# Patient Record
Sex: MALE | Race: WHITE
[De-identification: names, ages, dates, MRNs, and addresses within clinical notes are randomized per-mention and may not be internally consistent; named-entity substitution may affect disease eponyms.]

---

## 2018-02-09 ENCOUNTER — HOSPITAL ENCOUNTER (EMERGENCY)
Dept: HOSPITAL 76 - ED | Age: 48
Discharge: HOME | End: 2018-02-09
Payer: SELF-PAY

## 2018-02-09 VITALS — SYSTOLIC BLOOD PRESSURE: 141 MMHG | DIASTOLIC BLOOD PRESSURE: 97 MMHG
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
## 2018-02-09 DIAGNOSIS — S90.811A: ICD-10-CM

## 2018-02-09 DIAGNOSIS — Z02.89: Primary | ICD-10-CM

## 2018-02-09 PROCEDURE — 99282 EMERGENCY DEPT VISIT SF MDM: CPT

## 2018-02-09 PROCEDURE — 99283 EMERGENCY DEPT VISIT LOW MDM: CPT

## 2018-02-09 NOTE — ED PHYSICIAN DOCUMENTATION
History of Present Illness





- Stated complaint


Stated Complaint: FIT FOR CONFINEMENT





- History obtained from


History obtained from: Patient, Police





- History of Present Illness


Timing: Today





- Additonal information


Additional information: 





Patient is a 48 year old male brought in by police for fit for confinement.  

patient went out for a walk and allegedly was hiding from police and walked 

through some black berry bushes.  They wanted to make sure he didn't have any 

thorns on hi so they brought him n for evaluation.  Patient states he is up to 

date on his tetanus and was able to ambulate without any difficulty.   





Review of Systems


Constitutional: reports: Reviewed and negative


Eyes: reports: Reviewed and negative


Ears: reports: Reviewed and negative


Nose: reports: Reviewed and negative


Throat: reports: Reviewed and negative


Cardiac: reports: Reviewed and negative


Respiratory: reports: Reviewed and negative


GI: reports: Reviewed and negative


: reports: Reviewed and negative


Skin: reports: Abrasion (s)


Musculoskeletal: reports: Extremity pain


Neurologic: reports: Reviewed and negative


Psychiatric: reports: Reviewed and negative


Endocrine: reports: Reviewed and negative





PD PAST MEDICAL HISTORY





- Present Medications


Home Medications: 


 Ambulatory Orders











 Medication  Instructions  Recorded  Confirmed


 


No Known Home Medications [No  02/09/18 02/09/18





Known Home Medications]   














- Allergies


Allergies/Adverse Reactions: 


 Allergies











Allergy/AdvReac Type Severity Reaction Status Date / Time


 


No Known Drug Allergies Allergy   Verified 02/09/18 02:36














PD ED PE NORMAL





- Vitals


Vital signs reviewed: Yes





- General


General: Alert and oriented X 3, No acute distress, Well developed/nourished





- HEENT


HEENT: Atraumatic, PERRL





- Cardiac


Cardiac: RRR





- Respiratory


Respiratory: No respiratory distress





- Abdomen


Abdomen: Soft





- Neuro


Neuro: Alert and oriented X 3, No motor deficit, Normal speech


Eye Opening: Spontaneous


Motor: Obeys Commands


Verbal: Oriented


GCS Score: 15





- Psych


Psych: Normal mood





PD ED PE EXPANDED





- Extremities


Extremities: Right foot (superficial abrasion of right foot)





Results





- Vitals


Vitals: 


 Vital Signs - 24 hr











  02/09/18





  02:34


 


Temperature 36.9 C


 


Heart Rate 86


 


Respiratory 18





Rate 


 


Blood Pressure 141/97 H


 


O2 Saturation 97








 Oxygen











O2 Source                      Room air

















PD MEDICAL DECISION MAKING





- ED course


Complexity details: reviewed old records, reviewed results, re-evaluated patient

, considered differential, d/w patient


ED course: 





Patient was seen and examined at bedside.  patient was well appearing.  patient'

s wounds were cleaned and there was nothing to repair.  they were all 

superficial abrasions.  Patient was awake, alert oriented cooperative and was 

stable for discharge with outpatient follow up.   





Departure





- Departure


Disposition: 01 Home, Self Care


Clinical Impression: 


 Abrasion foot/toe





Condition: Good


Instructions:  ED Abrasion


Follow-Up: 


primary,care provider [Other] - As Needed


Comments: 


please keep your wound clean and dry and take motrin or tylenol as needed for 

pain.  You should monitor for signs of infection and follow up with your doctor 

for any of those signs.